# Patient Record
Sex: FEMALE | Race: OTHER | Employment: FULL TIME | URBAN - METROPOLITAN AREA
[De-identification: names, ages, dates, MRNs, and addresses within clinical notes are randomized per-mention and may not be internally consistent; named-entity substitution may affect disease eponyms.]

---

## 2017-01-24 ENCOUNTER — HOSPITAL ENCOUNTER (OUTPATIENT)
Facility: MEDICAL CENTER | Age: 52
End: 2017-01-24
Attending: EMERGENCY MEDICINE
Payer: COMMERCIAL

## 2017-01-24 ENCOUNTER — OFFICE VISIT (OUTPATIENT)
Dept: URGENT CARE | Facility: PHYSICIAN GROUP | Age: 52
End: 2017-01-24
Payer: COMMERCIAL

## 2017-01-24 ENCOUNTER — HOSPITAL ENCOUNTER (OUTPATIENT)
Dept: RADIOLOGY | Facility: MEDICAL CENTER | Age: 52
End: 2017-01-24
Attending: EMERGENCY MEDICINE
Payer: COMMERCIAL

## 2017-01-24 VITALS
WEIGHT: 170 LBS | HEART RATE: 80 BPM | BODY MASS INDEX: 28.29 KG/M2 | DIASTOLIC BLOOD PRESSURE: 80 MMHG | SYSTOLIC BLOOD PRESSURE: 124 MMHG | TEMPERATURE: 97.5 F | OXYGEN SATURATION: 94 %

## 2017-01-24 DIAGNOSIS — R10.9 FLANK PAIN: ICD-10-CM

## 2017-01-24 PROCEDURE — 87186 SC STD MICRODIL/AGAR DIL: CPT

## 2017-01-24 PROCEDURE — 74176 CT ABD & PELVIS W/O CONTRAST: CPT

## 2017-01-24 PROCEDURE — 87077 CULTURE AEROBIC IDENTIFY: CPT

## 2017-01-24 PROCEDURE — 99214 OFFICE O/P EST MOD 30 MIN: CPT | Performed by: EMERGENCY MEDICINE

## 2017-01-24 PROCEDURE — 87086 URINE CULTURE/COLONY COUNT: CPT

## 2017-01-24 RX ORDER — METHOCARBAMOL 750 MG/1
1500 TABLET, FILM COATED ORAL 3 TIMES DAILY
Qty: 60 TAB | Refills: 0 | Status: SHIPPED | OUTPATIENT
Start: 2017-01-24

## 2017-01-24 RX ORDER — KETOROLAC TROMETHAMINE 30 MG/ML
60 INJECTION, SOLUTION INTRAMUSCULAR; INTRAVENOUS ONCE
Status: COMPLETED | OUTPATIENT
Start: 2017-01-24 | End: 2017-01-24

## 2017-01-24 RX ORDER — HYDROCODONE BITARTRATE AND ACETAMINOPHEN 5; 325 MG/1; MG/1
1 TABLET ORAL EVERY 6 HOURS PRN
Qty: 15 TAB | Refills: 0 | Status: SHIPPED | OUTPATIENT
Start: 2017-01-24

## 2017-01-24 RX ADMIN — KETOROLAC TROMETHAMINE 60 MG: 30 INJECTION, SOLUTION INTRAMUSCULAR; INTRAVENOUS at 14:50

## 2017-01-24 ASSESSMENT — ENCOUNTER SYMPTOMS
FEVER: 0
NERVOUS/ANXIOUS: 0
VOMITING: 0
CHILLS: 0
EYE DISCHARGE: 0
NAUSEA: 0
SENSORY CHANGE: 0
ABDOMINAL PAIN: 0
EYE REDNESS: 0
SPEECH CHANGE: 0
BACK PAIN: 1
PERIANAL NUMBNESS: 0
LEG PAIN: 0
HEADACHES: 0

## 2017-01-24 NOTE — PROGRESS NOTES
Subjective:      Celeste Kee is a 51 y.o. female who presents with Back Pain            Back Pain  This is a new problem. Episode onset: for the past 2 days patient has had left-sided flank pain. Cannot get comfortable. At times she feels is musculoskeletal but has done no heavy lifting or straining. No history of kidney stones. The problem occurs intermittently. The problem has been gradually worsening since onset. The quality of the pain is described as aching. The pain is at a severity of 6/10. The pain is moderate. The symptoms are aggravated by bending and lying down. Associated symptoms include pelvic pain. Pertinent negatives include no abdominal pain, bladder incontinence, chest pain, dysuria, fever, headaches, leg pain or perianal numbness.   No Known Allergies   Social History     Social History   • Marital Status:      Spouse Name: N/A   • Number of Children: N/A   • Years of Education: N/A     Occupational History   • Not on file.     Social History Main Topics   • Smoking status: Never Smoker    • Smokeless tobacco: Never Used      Comment: is exposed to second hand smoke    • Alcohol Use: Yes      Comment: rare   • Drug Use: No   • Sexual Activity:     Partners: Male      Comment:      Other Topics Concern   • Not on file     Social History Narrative     Past Medical History   Diagnosis Date   • Mixed hyperlipidemia    • Other abnormal glucose    • Breast cyst      history   • Screening mammogram 10/08     Normal   • Allergic rhinitis, cause unspecified    • Unspecified asthma(493.90)      Current Outpatient Prescriptions on File Prior to Visit   Medication Sig Dispense Refill   • levothyroxine (SYNTHROID) 50 MCG Tab TAKE 1 TABLET BY MOUTH EVERY MORNING 90 Tab 3   • vitamin D3, cholecalciferol, 1000 UNIT Tab Take 3 Tabs by mouth every day. 100 Tab 3   • etodolac (LODINE) 400 MG tablet TAKE 1 TABLET BY MOUTH TWICE DAILY. TAKE WITH FOOD 30 Tab 3     No current facility-administered  medications on file prior to visit.     Family History   Problem Relation Age of Onset   • Diabetes Father    • Stroke Father    • Hypertension Father    • Cancer Maternal Aunt      breast and uterine   • Cancer Paternal Aunt      uterine   • Arthritis Mother        Review of Systems   Constitutional: Negative for fever and chills.   Eyes: Negative for discharge and redness.   Cardiovascular: Negative for chest pain.   Gastrointestinal: Negative for nausea, vomiting and abdominal pain.   Genitourinary: Positive for pelvic pain. Negative for bladder incontinence, dysuria, urgency, frequency and hematuria.        Patient has left flank pain.   Musculoskeletal: Positive for back pain. Negative for joint pain.   Skin: Negative for rash.   Neurological: Negative for sensory change, speech change and headaches.   Psychiatric/Behavioral: The patient is not nervous/anxious.           Objective:   Blood pressure 124/80, pulse 80, temperature 36.4 °C (97.5 °F), weight 77.111 kg (170 lb), SpO2 94 %.    Physical Exam   Constitutional: She appears well-developed and well-nourished. She appears distressed.   HENT:   Head: Normocephalic and atraumatic.   Right Ear: External ear normal.   Left Ear: External ear normal.   Mouth/Throat: Oropharynx is clear and moist. No oropharyngeal exudate.   Eyes: Conjunctivae and EOM are normal. Pupils are equal, round, and reactive to light. Right eye exhibits no discharge. Left eye exhibits no discharge.   Neck: No tracheal deviation present. No thyromegaly present.   Cardiovascular: Normal rate and regular rhythm.    No murmur heard.  Pulmonary/Chest: Effort normal and breath sounds normal. No respiratory distress. She has no wheezes.   Abdominal: Soft. She exhibits distension. She exhibits no mass. There is tenderness. There is no rebound and no guarding.   Musculoskeletal: Normal range of motion.   Lymphadenopathy:     She has no cervical adenopathy.   Skin: Skin is dry. Rash noted. She is  not diaphoretic.   Vitals reviewed.           Point-of-care urine leukocytes, trace heme ketones trace otherwise negative. Specific gravity 1.005.    CT rule out kidney stones negative for kidney stones.    Urine culture pending  Assessment/Plan:     Diagnosis acute left flank pain        I feel that her symptoms are primarily due to musculoskeletal discomfort. She has significant difficulty moving from standing to supine position due to her left-sided paraspinous back pain.  Patient will obtain a CT and I will go over the results with her. She's been given a work note for today and the next 2 days off. She'll return for fevers severe pain vomiting. I do not think she has a UTI or pyelonephritis but I'm going to order a urine culture on her.. Urine. Patient will be given a prescription for muscle relaxants as well as Norco. I will call her with the urine culture results

## 2017-01-24 NOTE — Clinical Note
January 24, 2017        Celeste Kee  [address]  [place] [address]        Dear Celeste:    Please ask to be allowed to stay home from  work for today and the next one-two days,    If you have any questions or concerns, please don't hesitate to call.        Sincerely,        ORVILLE Serna M.D.    Electronically Signed

## 2017-01-26 ENCOUNTER — TELEPHONE (OUTPATIENT)
Dept: URGENT CARE | Facility: PHYSICIAN GROUP | Age: 52
End: 2017-01-26

## 2017-01-26 LAB
BACTERIA UR CULT: ABNORMAL
SIGNIFICANT IND 70042: ABNORMAL
SITE SITE: ABNORMAL
SOURCE SOURCE: ABNORMAL

## 2017-01-26 RX ORDER — SULFAMETHOXAZOLE AND TRIMETHOPRIM 800; 160 MG/1; MG/1
1 TABLET ORAL EVERY 12 HOURS
Qty: 20 TAB | Refills: 0 | Status: SHIPPED | OUTPATIENT
Start: 2017-01-26 | End: 2017-02-14

## 2017-01-26 NOTE — TELEPHONE ENCOUNTER
I called pt and told her she had a UTI and i was going to call in RX Mimi DS for ten days. She will push fluids.

## 2017-02-14 ENCOUNTER — OFFICE VISIT (OUTPATIENT)
Dept: MEDICAL GROUP | Facility: PHYSICIAN GROUP | Age: 52
End: 2017-02-14
Payer: COMMERCIAL

## 2017-02-14 VITALS
OXYGEN SATURATION: 93 % | HEIGHT: 65 IN | DIASTOLIC BLOOD PRESSURE: 68 MMHG | HEART RATE: 100 BPM | BODY MASS INDEX: 29.66 KG/M2 | RESPIRATION RATE: 16 BRPM | TEMPERATURE: 97.5 F | WEIGHT: 178 LBS | SYSTOLIC BLOOD PRESSURE: 100 MMHG

## 2017-02-14 DIAGNOSIS — E78.2 MIXED HYPERLIPIDEMIA: ICD-10-CM

## 2017-02-14 DIAGNOSIS — F41.9 ANXIETY: ICD-10-CM

## 2017-02-14 DIAGNOSIS — N39.0 URINARY TRACT INFECTION, SITE UNSPECIFIED: ICD-10-CM

## 2017-02-14 DIAGNOSIS — R73.01 IMPAIRED FASTING GLUCOSE: ICD-10-CM

## 2017-02-14 DIAGNOSIS — E55.9 VITAMIN D DEFICIENCY: ICD-10-CM

## 2017-02-14 DIAGNOSIS — Z56.6 STRESSFUL JOB: ICD-10-CM

## 2017-02-14 DIAGNOSIS — E03.8 OTHER SPECIFIED HYPOTHYROIDISM: ICD-10-CM

## 2017-02-14 DIAGNOSIS — Z23 NEEDS FLU SHOT: ICD-10-CM

## 2017-02-14 PROCEDURE — 90471 IMMUNIZATION ADMIN: CPT | Performed by: FAMILY MEDICINE

## 2017-02-14 PROCEDURE — 90688 IIV4 VACCINE SPLT 0.5 ML IM: CPT | Performed by: FAMILY MEDICINE

## 2017-02-14 PROCEDURE — 99214 OFFICE O/P EST MOD 30 MIN: CPT | Mod: 25 | Performed by: FAMILY MEDICINE

## 2017-02-14 SDOH — HEALTH STABILITY - MENTAL HEALTH: OTHER PHYSICAL AND MENTAL STRAIN RELATED TO WORK: Z56.6

## 2017-02-14 ASSESSMENT — PAIN SCALES - GENERAL: PAINLEVEL: 3=SLIGHT PAIN

## 2017-02-14 NOTE — PROGRESS NOTES
Patient comes in with several issues. She is under a lot of stress at work and is finding this to be overwhelming. She requests a referral to [facility]. She would like to see a counselor about this. She is considering going to part-time or maybe even retiring but she is not sure yet.  Her back is bothering her when her stress level gets worse.  She is due for lab work.  She does not want to take any medications for stress right now she says.  She is due for a flu shot.    I reviewed the following    Past Medical History   Diagnosis Date   • Mixed hyperlipidemia    • Other abnormal glucose    • Breast cyst      history   • Screening mammogram 10/08     Normal   • Allergic rhinitis, cause unspecified    • Unspecified asthma(493.90)         Past Surgical History   Procedure Laterality Date   • Abdominal hysterectomy total  1993     partial hysterectomy due to fibroid tumor.       No Known Allergies    Current Outpatient Prescriptions   Medication Sig Dispense Refill   • methocarbamol (ROBAXIN) 750 MG Tab Take 2 Tabs by mouth 3 times a day. 60 Tab 0   • levothyroxine (SYNTHROID) 50 MCG Tab TAKE 1 TABLET BY MOUTH EVERY MORNING 90 Tab 3   • vitamin D3, cholecalciferol, 1000 UNIT Tab Take 3 Tabs by mouth every day. 100 Tab 3   • hydrocodone-acetaminophen (NORCO) 5-325 MG Tab per tablet Take 1 Tab by mouth every 6 hours as needed. 15 Tab 0   • etodolac (LODINE) 400 MG tablet TAKE 1 TABLET BY MOUTH TWICE DAILY. TAKE WITH FOOD 30 Tab 3     No current facility-administered medications for this visit.        Family History   Problem Relation Age of Onset   • Diabetes Father    • Stroke Father    • Hypertension Father    • Cancer Maternal Aunt      breast and uterine   • Cancer Paternal Aunt      uterine   • Arthritis Mother        Social History     Social History   • Marital Status:      Spouse Name: N/A   • Number of Children: N/A   • Years of Education: N/A     Occupational History   • Not on file.      Social History Main Topics   • Smoking status: Never Smoker    • Smokeless tobacco: Never Used      Comment: is exposed to second hand smoke    • Alcohol Use: Yes      Comment: rare   • Drug Use: No   • Sexual Activity:     Partners: Male      Comment:      Other Topics Concern   • Not on file     Social History Narrative        Physical Exam   Constitutional: She is oriented. She appears well-developed and well-nourished. No distress.   HENT:  Head: Normocephalic and atraumatic.   Right Ear: External ear normal. Ear canal and TM normal   Left Ear: External ear normal. Ear canal and TM normal  Nose: Nose normal.   Mouth/Throat: Oropharynx is clear and moist.   Eyes: Conjunctivae and extraocular motions are normal. Pupils are equal, round, and reactive to light. Fundi benign bilaterally   Neck: No thyromegaly present.   Cardiovascular: Normal rate, regular rhythm, normal heart sounds and intact distal pulses.  Exam reveals no gallop.    No murmur heard.  Pulmonary/Chest: Effort normal and breath sounds normal. No respiratory distress. She has no wheezes. She has no rales.   Abdominal: Soft. Bowel sounds are normal. No hepatosplenomegaly She exhibits no distension. No tenderness. She has no rebound and no guarding.   Musculoskeletal: Normal range of motion. She exhibits no edema and no tenderness.   Lymphadenopathy:     She has no cervical adenopathy.   No supraclavicular adenopathy  Neurological: She is alert and oriented. She has normal reflexes.        Babinskis downgoing bilaterally   Skin: Skin is warm and dry. No rash noted. No erythema.   Psychiatric: She has a normal mood and appropriate affect. Her behavior is normal. Judgment and thought content normal.     1. Stressful job  REFERRAL TO PSYCHOLOGY--[facility]    2. Impaired fasting glucose --needs follow-up  COMP METABOLIC PANEL   3. Other specified hypothyroidism   labs at the end of September 2016 were fine    4. Anxiety  REFERRAL  TO PSYCHOLOGY--[facility]    5. Vitamin D deficiency --needs reassessment  VITAMIN D 25-HYDROXY   6. Urinary tract infection, site unspecified --patient has finished antibiotics. Needs reassessment  URINALYSIS,CULTURE IF INDICATED   7. Mixed hyperlipidemia--needs reassessment  CBC WITH DIFFERENTIAL    COMP METABOLIC PANEL    LIPID PROFILE    VITAMIN D 25-HYDROXY   8. Needs flu shot  INFLUENZA VACCINE QUAD INJECTION >3Y(PRESERVED)     Recheck when necessary    Please note that this dictation was created using voice recognition software. I have worked with consultants from the vendor as well as technical experts from [employer] to optimize the interface. I have made every reasonable attempt to correct obvious errors, but I expect that there are errors of grammar and possibly content that I did not discover before finalizing the note.

## 2017-02-14 NOTE — PATIENT INSTRUCTIONS
Patient given written instructions regarding labs, medications, referrals, dietary and lifestyle management, and return visit.    Jalen Us MD

## 2017-02-14 NOTE — MR AVS SNAPSHOT
"        Celeste Kee   2017 8:00 AM   Office Visit   MRN: [id number]    Department:  [facility]   Dept Phone:  [phone]    Description:  Female : 1965   Provider:  Jalen Us M.D.           Reason for Visit     Follow-Up back pain      Allergies as of 2017     No Known Allergies      You were diagnosed with     Stressful job   [983450]       Impaired fasting glucose   [790.21.ICD-9-CM]       Other specified hypothyroidism   [5762180]       Anxiety   [959227]       Vitamin D deficiency   [5517556]       Urinary tract infection, site unspecified   [9958820]       Mixed hyperlipidemia   [272.2.ICD-9-CM]       Needs flu shot   [511987]         Vital Signs     Blood Pressure Pulse Temperature Respirations Height Weight    100/68 mmHg 100 36.4 °C (97.5 °F) 16 1.651 m (5' 5\") 80.74 kg (178 lb)    Body Mass Index Oxygen Saturation Smoking Status             29.62 kg/m2 93% Never Smoker          Basic Information     Date Of Birth Sex Race Ethnicity Preferred Language    1965 Female Other Unknown English      Your appointments     2017  4:00 PM   Established Patient with Jalen Us M.D.   [address] [address] [phone]           You will be receiving a confirmation call a few days before your appointment from our automated call confirmation system.              Problem List              ICD-10-CM Priority Class Noted - Resolved    Allergic rhinitis J30.9   Unknown - Present    Asthma J45.909   Unknown - Present    Mixed hyperlipidemia E78.2   Unknown - Present    Other abnormal glucose R73.09   Unknown - Present    Vitamin D deficiency E55.9   8/15/2013 - Present    Family history of osteoporosis Z82.62   8/15/2013 - Present    Lower urinary tract infectious disease N39.0   10/5/2013 - Present    Hypothyroidism E03.9   9/3/2015 - Present    Impaired fasting glucose R73.01   2016 - Present      "   Health Maintenance        Date Due Completion Dates    IMM DTaP/Tdap/Td Vaccine (1 - Tdap) 10/24/1984 ---    IMM PNEUMOCOCCAL 19-64 (ADULT) MEDIUM RISK SERIES (1 of 1 - PPSV23) 10/24/1984 ---    COLONOSCOPY 10/24/2015 ---    IMM INFLUENZA (1) 9/1/2016 1/4/2016    PAP SMEAR 5/8/2017 5/8/2014, 2/7/2012    MAMMOGRAM 10/31/2017 10/31/2016, 6/24/2014, 1/25/2012, 12/8/2010, 10/28/2009, 10/28/2009, 10/23/2008, 10/23/2008, 9/21/2007, 9/21/2007, 9/20/2006, 9/6/2005, 9/17/2004, 9/8/2004            Current Immunizations     Influenza Vaccine Quad Inj (Pf) 1/4/2016    Influenza Vaccine Quad Inj (Preserved) 2/14/2017      Below and/or attached are the medications your provider expects you to take. Review all of your home medications and newly ordered medications with your provider and/or pharmacist. Follow medication instructions as directed by your provider and/or pharmacist. Please keep your medication list with you and share with your provider. Update the information when medications are discontinued, doses are changed, or new medications (including over-the-counter products) are added; and carry medication information at all times in the event of emergency situations     Allergies:  No Known Allergies          Medications  Valid as of: February 14, 2017 -  8:36 AM    Generic Name Brand Name Tablet Size Instructions for use    Cholecalciferol (Tab) vitamin D3 (cholecalciferol) 1000 UNIT Take 3 Tabs by mouth every day.        Etodolac (Tab) LODINE 400 MG TAKE 1 TABLET BY MOUTH TWICE DAILY. TAKE WITH FOOD        Hydrocodone-Acetaminophen (Tab) NORCO 5-325 MG Take 1 Tab by mouth every 6 hours as needed.        Levothyroxine Sodium (Tab) SYNTHROID 50 MCG TAKE 1 TABLET BY MOUTH EVERY MORNING        Methocarbamol (Tab) ROBAXIN 750 MG Take 2 Tabs by mouth 3 times a day.        .                 Medicines prescribed today were sent to:     [id number] - [place] - [address]  [place] [address]    Phone: [phone] Fax: [phone]    Open 24 Hours?: No      Medication refill instructions:       If your prescription bottle indicates you have medication refills left, it is not necessary to call your provider’s office. Please contact your pharmacy and they will refill your medication.    If your prescription bottle indicates you do not have any refills left, you may request refills at any time through one of the following ways: The online AppSense system (except Urgent Care), by calling your provider’s office, or by asking your pharmacy to contact your provider’s office with a refill request. Medication refills are processed only during regular business hours and may not be available until the next business day. Your provider may request additional information or to have a follow-up visit with you prior to refilling your medication.   *Please Note: Medication refills are assigned a new Rx number when refilled electronically. Your pharmacy may indicate that no refills were authorized even though a new prescription for the same medication is available at the pharmacy. Please request the medicine by name with the pharmacy before contacting your provider for a refill.        Your To Do List     Future Labs/Procedures Complete By Expires    CBC WITH DIFFERENTIAL  As directed 2/15/2018    COMP METABOLIC PANEL  As directed 2/15/2018    LIPID PROFILE  As directed 2/15/2018    URINALYSIS,CULTURE IF INDICATED  As directed 2/15/2018      Referral     A referral request has been sent to our patient care coordination department. Please allow 3-5 business days for us to process this request and contact you either by phone or mail. If you do not hear from us by the 5th business day, please call us at [phone].           AppSense Access Code: [email or web address]  Expires: 2/23/2017 12:33 PM    AppSense  A secure, online tool to manage your health information     [employer]’s Mazoom  is a secure, online tool that connects you to your personalized health information from the privacy of your home -- day or night - making it very easy for you to manage your healthcare. Once the activation process is completed, you can even access your medical information using the Digicompanion keesha, which is available for free in the Apple Keesha store or Google Play store.     Digicompanion provides the following levels of access (as shown below):   My Chart Features   [facility] Doctor [facility]  Specialists [facility]  Urgent  Care [facility]  Primary Care  Doctor   Email your healthcare team securely and privately 24/7 X X X    Manage appointments: schedule your next appointment; view details of past/upcoming appointments X      Request prescription refills. X      View recent personal medical records, including lab and immunizations X X X X   View health record, including health history, allergies, medications X X X X   Read reports about your outpatient visits, procedures, consult and ER notes X X X X   See your discharge summary, which is a recap of your hospital and/or ER visit that includes your diagnosis, lab results, and care plan. X X       How to register for Digicompanion:  1. Go to  [email or web address].  2. Click on the Sign Up Now box, which takes you to the New Member Sign Up page. You will need to provide the following information:  a. Enter your Digicompanion Access Code exactly as it appears at the top of this page. (You will not need to use this code after you’ve completed the sign-up process. If you do not sign up before the expiration date, you must request a new code.)   b. Enter your date of birth.   c. Enter your home email address.   d. Click Submit, and follow the next screen’s instructions.  3. Create a HeatSynct ID. This will be your Digicompanion login ID and cannot be changed, so think of one that is secure and easy to remember.  4. Create a Digicompanion password. You can change your password at any  time.  5. Enter your Password Reset Question and Answer. This can be used at a later time if you forget your password.   6. Enter your e-mail address. This allows you to receive e-mail notifications when new information is available in [employer].  7. Click Sign Up. You can now view your health information.    For assistance activating your [employer] account, call [phone]

## 2017-02-15 ENCOUNTER — HOSPITAL ENCOUNTER (OUTPATIENT)
Dept: LAB | Facility: MEDICAL CENTER | Age: 52
End: 2017-02-15
Attending: FAMILY MEDICINE
Payer: COMMERCIAL

## 2017-02-15 DIAGNOSIS — R73.01 IMPAIRED FASTING GLUCOSE: ICD-10-CM

## 2017-02-15 DIAGNOSIS — E78.2 MIXED HYPERLIPIDEMIA: ICD-10-CM

## 2017-02-15 DIAGNOSIS — N39.0 URINARY TRACT INFECTION, SITE UNSPECIFIED: ICD-10-CM

## 2017-02-15 LAB
25(OH)D3 SERPL-MCNC: 37 NG/ML (ref 30–100)
ALBUMIN SERPL BCP-MCNC: 4.1 G/DL (ref 3.2–4.9)
ALBUMIN/GLOB SERPL: 1.3 G/DL
ALP SERPL-CCNC: 61 U/L (ref 30–99)
ALT SERPL-CCNC: 20 U/L (ref 2–50)
ANION GAP SERPL CALC-SCNC: 9 MMOL/L (ref 0–11.9)
AST SERPL-CCNC: 17 U/L (ref 12–45)
BASOPHILS # BLD AUTO: 0.04 K/UL (ref 0–0.12)
BASOPHILS NFR BLD AUTO: 0.7 % (ref 0–1.8)
BILIRUB SERPL-MCNC: 0.7 MG/DL (ref 0.1–1.5)
BUN SERPL-MCNC: 11 MG/DL (ref 8–22)
CALCIUM SERPL-MCNC: 9.7 MG/DL (ref 8.5–10.5)
CHLORIDE SERPL-SCNC: 105 MMOL/L (ref 96–112)
CHOLEST SERPL-MCNC: 198 MG/DL (ref 100–199)
CO2 SERPL-SCNC: 25 MMOL/L (ref 20–33)
CREAT SERPL-MCNC: 0.73 MG/DL (ref 0.5–1.4)
EOSINOPHIL # BLD: 0.07 K/UL (ref 0–0.51)
EOSINOPHIL NFR BLD AUTO: 1.2 % (ref 0–6.9)
ERYTHROCYTE [DISTWIDTH] IN BLOOD BY AUTOMATED COUNT: 44.6 FL (ref 35.9–50)
GLOBULIN SER CALC-MCNC: 3.1 G/DL (ref 1.9–3.5)
GLUCOSE SERPL-MCNC: 96 MG/DL (ref 65–99)
HCT VFR BLD AUTO: 41.3 % (ref 37–47)
HDLC SERPL-MCNC: 49 MG/DL
HGB BLD-MCNC: 13.6 G/DL (ref 12–16)
IMM GRANULOCYTES # BLD AUTO: 0.01 K/UL (ref 0–0.11)
IMM GRANULOCYTES NFR BLD AUTO: 0.2 % (ref 0–0.9)
LDLC SERPL CALC-MCNC: 107 MG/DL
LYMPHOCYTES # BLD: 1.72 K/UL (ref 1–4.8)
LYMPHOCYTES NFR BLD AUTO: 29.6 % (ref 22–41)
MCH RBC QN AUTO: 31.1 PG (ref 27–33)
MCHC RBC AUTO-ENTMCNC: 32.9 G/DL (ref 33.6–35)
MCV RBC AUTO: 94.3 FL (ref 81.4–97.8)
MONOCYTES # BLD: 0.5 K/UL (ref 0–0.85)
MONOCYTES NFR BLD AUTO: 8.6 % (ref 0–13.4)
NEUTROPHILS # BLD: 3.48 K/UL (ref 2–7.15)
NEUTROPHILS NFR BLD AUTO: 59.7 % (ref 44–72)
NRBC # BLD AUTO: 0 K/UL
NRBC BLD-RTO: 0 /100 WBC
PLATELET # BLD AUTO: 254 K/UL (ref 164–446)
PMV BLD AUTO: 10.7 FL (ref 9–12.9)
POTASSIUM SERPL-SCNC: 4.1 MMOL/L (ref 3.6–5.5)
PROT SERPL-MCNC: 7.2 G/DL (ref 6–8.2)
RBC # BLD AUTO: 4.38 M/UL (ref 4.2–5.4)
SODIUM SERPL-SCNC: 139 MMOL/L (ref 135–145)
TRIGL SERPL-MCNC: 212 MG/DL (ref 0–149)
WBC # BLD AUTO: 5.8 K/UL (ref 4.8–10.8)

## 2017-02-15 PROCEDURE — 36415 COLL VENOUS BLD VENIPUNCTURE: CPT

## 2017-02-15 PROCEDURE — 80061 LIPID PANEL: CPT

## 2017-02-15 PROCEDURE — 81001 URINALYSIS AUTO W/SCOPE: CPT

## 2017-02-15 PROCEDURE — 87086 URINE CULTURE/COLONY COUNT: CPT

## 2017-02-15 PROCEDURE — 82306 VITAMIN D 25 HYDROXY: CPT

## 2017-02-15 PROCEDURE — 80053 COMPREHEN METABOLIC PANEL: CPT

## 2017-02-15 PROCEDURE — 85025 COMPLETE CBC W/AUTO DIFF WBC: CPT

## 2017-02-16 LAB
APPEARANCE UR: CLEAR
BACTERIA #/AREA URNS HPF: ABNORMAL /HPF
BILIRUB UR QL STRIP.AUTO: NEGATIVE
COLOR UR AUTO: YELLOW
CULTURE IF INDICATED INDCX: YES UA CULTURE
EPITHELIAL CELLS 1715: ABNORMAL /HPF
GLUCOSE UR STRIP.AUTO-MCNC: NEGATIVE MG/DL
KETONES UR STRIP.AUTO-MCNC: NEGATIVE MG/DL
LEUKOCYTE ESTERASE UR QL STRIP.AUTO: ABNORMAL
MICRO URNS: ABNORMAL
NITRITE UR QL STRIP.AUTO: NEGATIVE
PH UR: 6.5 [PH]
PROT UR QL STRIP: NEGATIVE MG/DL
RBC #/AREA URNS HPF: ABNORMAL /HPF
RBC UR QL AUTO: NEGATIVE
SP GR UR STRIP.AUTO: 1.02
WBC #/AREA URNS HPF: ABNORMAL /HPF

## 2017-02-18 LAB
BACTERIA UR CULT: NORMAL
SIGNIFICANT IND 70042: NORMAL
SOURCE SOURCE: NORMAL

## 2017-05-04 ENCOUNTER — APPOINTMENT (OUTPATIENT)
Dept: BEHAVIORAL HEALTH | Facility: PHYSICIAN GROUP | Age: 52
End: 2017-05-04
Payer: COMMERCIAL

## 2021-03-15 DIAGNOSIS — Z23 NEED FOR VACCINATION: ICD-10-CM
